# Patient Record
Sex: FEMALE | URBAN - METROPOLITAN AREA
[De-identification: names, ages, dates, MRNs, and addresses within clinical notes are randomized per-mention and may not be internally consistent; named-entity substitution may affect disease eponyms.]

---

## 2022-12-22 ENCOUNTER — TRANSCRIBE ORDER (OUTPATIENT)
Dept: SCHEDULING | Age: 67
End: 2022-12-22

## 2022-12-22 DIAGNOSIS — R41.89 COGNITIVE CHANGES: Primary | ICD-10-CM

## 2022-12-27 ENCOUNTER — TELEPHONE (OUTPATIENT)
Dept: NEUROLOGY | Age: 67
End: 2022-12-27

## 2022-12-30 ENCOUNTER — HOSPITAL ENCOUNTER (OUTPATIENT)
Dept: CT IMAGING | Age: 67
End: 2022-12-30
Attending: NURSE PRACTITIONER
Payer: COMMERCIAL

## 2022-12-30 DIAGNOSIS — R41.89 COGNITIVE CHANGES: ICD-10-CM

## 2022-12-30 PROCEDURE — 70450 CT HEAD/BRAIN W/O DYE: CPT

## 2023-04-21 ENCOUNTER — OFFICE VISIT (OUTPATIENT)
Dept: NEUROLOGY | Age: 68
End: 2023-04-21

## 2023-04-21 DIAGNOSIS — R41.840 ATTENTION DEFICIT: ICD-10-CM

## 2023-04-21 DIAGNOSIS — R41.3 MEMORY LOSS: ICD-10-CM

## 2023-04-21 DIAGNOSIS — R41.3 MEMORY LOSS: Primary | ICD-10-CM

## 2023-04-21 DIAGNOSIS — F33.41 RECURRENT MAJOR DEPRESSIVE DISORDER, IN PARTIAL REMISSION (HCC): Primary | ICD-10-CM

## 2023-04-21 NOTE — PROGRESS NOTES
Intake Note      Patient Name: Khurram Burnham  YOB: 1955    Age: 79 y.o. Date of Intake: 4/21/2023   Education: 18 Ethnicity White   Gender: Female Referring Provider: Sybil Brunner, NP     REASON FOR REFERRAL AND EVALUATION PROCEDURES:  Khurram Burnham  was referred for evaluation by her Primary care provider to assist in differential diagnosis and individualized treatment planning. she understood the rationale and procedures for evaluation, as well as the limits to confidentiality, and agreed to participate. she consented to have this report made available to her  treating providers through her  electronic medical records. History Sources: Patient, Spouse, and Medical Records    HISTORY OF PRESENT ILLNESS:  The patient is a pleasant 71-year-old woman whose medical history is noted for obstructive sleep apnea (on CPAP) and depression/anxiety. She presents accompanied accompanied by her . According to the patient and her , they moved to this area approximately 1 year ago and within the context of the move, the patient started experiencing difficulties related to her cognitive functioning. Both the patient and her  reported the problems have not been progressive but instead have remained stable or potentially improved slightly since adjusting to the move. They both question whether the patient has a history of ADHD as her biological daughter has ADHD and she has noticed some similarities. However, upon questioning, she she did not report sufficient symptomatology during childhood. The patient has been reported patient has \"always been forgetful;\" however, he stated there has been evidence of greater forgetfulness, such as the patient; will stay the same, having conversation with family, and sending the same less than 20 minutes later.   He also reported the patient continues to have difficulty locating items in the kitchen cabinets even though they have been in the house for a year. The patient's  also reported the patient appears to have difficulty with executive functioning, evidenced by challenges with sequencing tasks and multitasking, such as when she is following a recipe. Both the patient and her  acknowledged these problems present frustration but they do not interfere with her daily functioning and she remains functionally dependent. Pertaining the patient's psychiatric history, she reported she experienced an episode of depression circa 1994 that was related to familial situational factors. At that time, she was prescribed a depressive medication, which she continues to take. She stated some of the depressive symptoms have \"lingered\" but she reportedly do not interfere with her quality of life. The patient had a recent mood to be \"eh. .. Not bad. \"  She denied history of auditory hallucinations and she denied history of passive or active suicidal ideation, intent, or plan. PERTINENT MEDICAL HISTORY:  Depression with anxiety  GEENA on CPAP    Pertaining to the patient's other medical and physical functioning, she reported going to bed around 1:00 AM and waking up between 10:30 AM 11:00 AM.  She denied problems initiating or sustaining sleep and she reported she has full compliant with wearing her CPAP. The patient denied experiencing physical symptoms to be suggestive of parkinsonism, numbness J[place], or sensory loss.     Family neurological history: Reportedly unremarkable    The patient's medical records last the following current prescriptions:  Zachary melts calcium   Bariatric choice once daily bariatric multivitamin   Zachary melts iron and vitamin   Bupropion   Biotin   Fluoxetine   Donepezil   Nourishvita  Biotrust Curcumin    Pertinent Neurological History:  Seizure: Never  Stroke: Never  TBI: Never    Neurodiagnostic Findings:  Imaging: CT head (12/30/2022) revealed \"no acute brain process identified\"\" there is no significant white matter disease. \"    Pertinent Labs:  Lab Date Result   TSH 10/12/2020 High at: 5.590 uIU/mL   Vitamin B12 10/12/2022 Within reference range   T4, free 10/12/2022 Within reference range   Vitamin D 10/12/2022 Within reference range     PSYCHOSOCIAL HISTORY:  Birth/Development: Born and raised in [place]. To the best of her knowledge, she was departmental pregnancy, she was born on time, and she met all milestones appropriately. Language: English  Education: Bachelor's degree in psychology and masters degree in psychology and rehab counseling. Academic Problems: Denied  Occupation: [employer]; [employer] at [employer]; [employer] as a college admissions counselor   Service: None  Relationship Status:  23 years  Children: 1 biological daughter and 1 daughter through marriage  Housing: Independently with  prior residence  Legal: Denied.  is POA    Substance Use:  Alcohol: Reported consuming alcohol during college. Denied more recent consumption. Nicotine: Started smoking in college and quit 23 years ago. Highest level of use was approximately 1 pack/day. Recreational/Illicit Substances: Denied  Treatment: Denied    BEHAVIORAL OBSERVATIONS:  Appearance: Casually dressed, Well-groomed, and Appeared stated age  Orientation: Generally oriented to time. Attempted to consult her watch for the date. Oriented to person, Place, and Situation  Motor: Ambulated independently, Adequate gait, Adequate posture, No involuntary movements, and Adequate strength  Thought Processes: Clear, Coherent, Intact, Logical, and Organized  Hearing and Vision: Adequate  Speech: Appropriate for rate, tone, prosody, and volume.   Comprehension: Adequate  Interpersonal Skills: Adequate  Affect: Appropriate  Ability as Historian: Adequate  Insight: Adequate  Judgment: Adequate    STRENGTHS:  Exercising self-direction/Resourceful, Access to housing/residential stability, and Interpersonal/supportive relationships (family, friends, peers)    WEAKNESSES:  Health problems and Cognitive limitations    IMPRESSION:  The patient is a pleasant 51-year-old woman presents with concerns regarding her cognitive functioning. At the current time, the presence of cognitive weaknesses, the magnitude of these weaknesses, and the pattern of strengths and weaknesses are unknown. The degree to which the patient's cognitive weaknesses are related to normal aging versus psychiatric factors versus an organic etiology requires further clarification. Comprehensive evaluation will assist with obtaining a quantitative assessment the patient's cognitive emotional functioning in order to guide differential diagnosis and treatment planning. ASSESSMENT:  Major depressive disorder in partial remission: F33.41  Memory loss: R41.3  Attention deficit: R41.840    PLAN:  Patient will participate in comprehensive evaluation in order to obtain a quantitative assessment of their cognitive and emotional functioning  Differential diagnosis and treatment planning will be based upon results from clinical interview and objective testing  Patient will be provided with review of results, impressions, and recommendations during feedback session  Patient will be encouraged to follow-up with referring provider for ongoing management    TIME DOCUMENTATION:  Clinical Interview: 09:05 - 09:25 = 20 minutes    [email or web address]    Please note this dictation was completed with [employer], the [employer] voice recognition software. Quite often unanticipated grammatical, syntax, homophones, and other interpretive errors are inadvertently transcribed by the computer software. Please disregard these errors. Please excuse any errors that have escaped final proofreading. Thank you.

## 2023-04-25 NOTE — PROGRESS NOTES
This note has been temporarily closed due to the implementation of a new EHR. Note will be amended in the future.

## 2023-05-17 ENCOUNTER — TELEPHONE (OUTPATIENT)
Age: 68
End: 2023-05-17

## 2023-05-19 PROBLEM — G31.84 MILD COGNITIVE IMPAIRMENT: Status: ACTIVE | Noted: 2023-05-19

## 2023-05-23 ENCOUNTER — OFFICE VISIT (OUTPATIENT)
Age: 68
End: 2023-05-23
Payer: MEDICARE

## 2023-05-23 DIAGNOSIS — G31.84 MILD COGNITIVE IMPAIRMENT: Primary | ICD-10-CM

## 2023-05-23 PROCEDURE — 96133 NRPSYC TST EVAL PHYS/QHP EA: CPT | Performed by: CLINICAL NEUROPSYCHOLOGIST

## 2023-05-23 PROCEDURE — 96132 NRPSYC TST EVAL PHYS/QHP 1ST: CPT | Performed by: CLINICAL NEUROPSYCHOLOGIST

## 2023-05-23 NOTE — PROGRESS NOTES
Prior to seeing the patient I reviewed pertinent records, including the previously completed report, the records in [place], and any updated visits from other providers since the patient's last visit. Today, I engaged in a psychoeducational and supportive feedback session with the patient. I provided psychotherapy in the form of psychoeducation and support with respect to the results of the recent Neuropsychological Evaluation, including discussing individual tests as well as patient's areas of neurocognitive strength versus weakness. We discussed, in detail, the following:  Relative to the patient's estimated baseline level of functioning, cognitive testing revealed moderate to advanced declines across several domains  Patient reported she was severely emotionally distressed throughout the evaluation and she believes that played a role in her test performance. Her  corroborated this report and indicated the patient was upset for days following the evaluation.  stated that since they moved, the patient does appear to have improved with regard to her cognitive functioning. We discussed how the patient's emotional distress was not identified as her report of symptoms related to depression and anxiety was within normal limits. Discussed how depression and anxiety, particularly if it is that severe, can contribute to cognitive impairment. Reviewed recommendations outlined in report  Answered questions to the best of my ability    Education was provided regarding my diagnostic impressions, and we discussed treatment plan/options. I also answered numerous questions related to the clinical findings, including discussing various methods to improve cognition and mood. Supportive/Cognitive Behavioral/Solution Focused psychotherapy provided. The patient needs to:    Follow-up with referring provider for ongoing management  Complete driving evaluation  Use practical strategies to compensate

## 2024-04-22 ENCOUNTER — TELEPHONE (OUTPATIENT)
Age: 69
End: 2024-04-22

## 2024-04-22 NOTE — TELEPHONE ENCOUNTER
Patient would like to cancel her NP appt with Dr. Cheema on 4/23. She does not wish to reschedule at this time. Thank you.

## 2025-06-19 ENCOUNTER — TRANSCRIBE ORDERS (OUTPATIENT)
Facility: HOSPITAL | Age: 70
End: 2025-06-19

## 2025-06-19 DIAGNOSIS — R51.9 FACIAL PAIN: Primary | ICD-10-CM

## 2025-07-10 ENCOUNTER — HOSPITAL ENCOUNTER (OUTPATIENT)
Age: 70
Discharge: HOME | End: 2025-07-13
Payer: MEDICARE

## 2025-07-10 DIAGNOSIS — R51.9 FACIAL PAIN: ICD-10-CM

## 2025-07-10 PROCEDURE — 70553 MRI BRAIN STEM W/O & W/DYE: CPT

## 2025-07-10 PROCEDURE — A9579 GAD-BASE MR CONTRAST NOS,1ML: HCPCS | Performed by: RADIOLOGY

## 2025-07-10 PROCEDURE — 6360000004 HC RX CONTRAST MEDICATION: Performed by: RADIOLOGY

## 2025-07-10 RX ORDER — GADOTERIDOL 279.3 MG/ML
17 INJECTION INTRAVENOUS
Status: COMPLETED | OUTPATIENT
Start: 2025-07-10 | End: 2025-07-10

## 2025-07-10 RX ADMIN — GADOTERIDOL 17 ML: 279.3 INJECTION, SOLUTION INTRAVENOUS at 09:58
